# Patient Record
Sex: FEMALE | URBAN - METROPOLITAN AREA
[De-identification: names, ages, dates, MRNs, and addresses within clinical notes are randomized per-mention and may not be internally consistent; named-entity substitution may affect disease eponyms.]

---

## 2018-11-12 ENCOUNTER — APPOINTMENT (OUTPATIENT)
Dept: URBAN - METROPOLITAN AREA SURGERY 9 | Age: 33
Setting detail: DERMATOLOGY
End: 2018-11-13

## 2018-11-12 DIAGNOSIS — L65.0 TELOGEN EFFLUVIUM: ICD-10-CM

## 2018-11-12 PROBLEM — L65.9 NONSCARRING HAIR LOSS, UNSPECIFIED: Status: ACTIVE | Noted: 2018-11-12

## 2018-11-12 PROCEDURE — OTHER OTHER: OTHER

## 2018-11-12 PROCEDURE — 99202 OFFICE O/P NEW SF 15 MIN: CPT

## 2018-11-12 PROCEDURE — OTHER ORDER TESTS: OTHER

## 2018-11-12 PROCEDURE — OTHER COUNSELING: OTHER

## 2018-11-12 ASSESSMENT — LOCATION ZONE DERM: LOCATION ZONE: SCALP

## 2018-11-12 ASSESSMENT — LOCATION DETAILED DESCRIPTION DERM: LOCATION DETAILED: RIGHT MEDIAL FRONTAL SCALP

## 2018-11-12 ASSESSMENT — LOCATION SIMPLE DESCRIPTION DERM: LOCATION SIMPLE: RIGHT SCALP

## 2018-11-12 NOTE — PROCEDURE: OTHER
Detail Level: Detailed
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 she is unsure how significant the shedding has been, so will keep telogen effluvium as a possibility\\n\\nUnrelated, she has some traction alopecia along the frontal and bitemporal region. Reviewed not making her ponytail too tight
Note Text (......Xxx Chief Complaint.): This diagnosis correlates with the